# Patient Record
Sex: FEMALE | Race: WHITE | NOT HISPANIC OR LATINO | URBAN - METROPOLITAN AREA
[De-identification: names, ages, dates, MRNs, and addresses within clinical notes are randomized per-mention and may not be internally consistent; named-entity substitution may affect disease eponyms.]

---

## 2017-06-13 ENCOUNTER — OUTPATIENT (OUTPATIENT)
Dept: OUTPATIENT SERVICES | Facility: HOSPITAL | Age: 67
LOS: 1 days | Discharge: HOME | End: 2017-06-13

## 2017-06-28 DIAGNOSIS — Z12.31 ENCOUNTER FOR SCREENING MAMMOGRAM FOR MALIGNANT NEOPLASM OF BREAST: ICD-10-CM

## 2018-06-20 ENCOUNTER — OUTPATIENT (OUTPATIENT)
Dept: OUTPATIENT SERVICES | Facility: HOSPITAL | Age: 68
LOS: 1 days | Discharge: HOME | End: 2018-06-20

## 2018-06-20 DIAGNOSIS — Z12.31 ENCOUNTER FOR SCREENING MAMMOGRAM FOR MALIGNANT NEOPLASM OF BREAST: ICD-10-CM

## 2019-06-26 ENCOUNTER — OUTPATIENT (OUTPATIENT)
Dept: OUTPATIENT SERVICES | Facility: HOSPITAL | Age: 69
LOS: 1 days | Discharge: HOME | End: 2019-06-26
Payer: MEDICARE

## 2019-06-26 DIAGNOSIS — Z12.31 ENCOUNTER FOR SCREENING MAMMOGRAM FOR MALIGNANT NEOPLASM OF BREAST: ICD-10-CM

## 2019-06-26 PROCEDURE — 77063 BREAST TOMOSYNTHESIS BI: CPT | Mod: 26

## 2019-06-26 PROCEDURE — 77067 SCR MAMMO BI INCL CAD: CPT | Mod: 26

## 2019-07-28 ENCOUNTER — FORM ENCOUNTER (OUTPATIENT)
Age: 69
End: 2019-07-28

## 2019-09-02 ENCOUNTER — FORM ENCOUNTER (OUTPATIENT)
Age: 69
End: 2019-09-02

## 2019-09-22 ENCOUNTER — FORM ENCOUNTER (OUTPATIENT)
Age: 69
End: 2019-09-22

## 2020-03-01 ENCOUNTER — FORM ENCOUNTER (OUTPATIENT)
Age: 70
End: 2020-03-01

## 2020-06-29 ENCOUNTER — FORM ENCOUNTER (OUTPATIENT)
Age: 70
End: 2020-06-29

## 2020-06-30 ENCOUNTER — OUTPATIENT (OUTPATIENT)
Dept: OUTPATIENT SERVICES | Facility: HOSPITAL | Age: 70
LOS: 1 days | Discharge: HOME | End: 2020-06-30
Payer: MEDICARE

## 2020-06-30 DIAGNOSIS — Z12.31 ENCOUNTER FOR SCREENING MAMMOGRAM FOR MALIGNANT NEOPLASM OF BREAST: ICD-10-CM

## 2020-06-30 PROCEDURE — 77063 BREAST TOMOSYNTHESIS BI: CPT | Mod: 26

## 2020-06-30 PROCEDURE — 77067 SCR MAMMO BI INCL CAD: CPT | Mod: 26

## 2020-08-25 PROBLEM — Z00.00 ENCOUNTER FOR PREVENTIVE HEALTH EXAMINATION: Status: ACTIVE | Noted: 2020-08-25

## 2020-09-23 ENCOUNTER — APPOINTMENT (OUTPATIENT)
Dept: ORTHOPEDIC SURGERY | Facility: CLINIC | Age: 70
End: 2020-09-23
Payer: MEDICARE

## 2020-09-23 DIAGNOSIS — Z86.79 PERSONAL HISTORY OF OTHER DISEASES OF THE CIRCULATORY SYSTEM: ICD-10-CM

## 2020-09-23 DIAGNOSIS — M25.562 PAIN IN LEFT KNEE: ICD-10-CM

## 2020-09-23 DIAGNOSIS — M17.12 UNILATERAL PRIMARY OSTEOARTHRITIS, LEFT KNEE: ICD-10-CM

## 2020-09-23 DIAGNOSIS — Z86.39 PERSONAL HISTORY OF OTHER ENDOCRINE, NUTRITIONAL AND METABOLIC DISEASE: ICD-10-CM

## 2020-09-23 DIAGNOSIS — Z80.3 FAMILY HISTORY OF MALIGNANT NEOPLASM OF BREAST: ICD-10-CM

## 2020-09-23 DIAGNOSIS — Z80.0 FAMILY HISTORY OF MALIGNANT NEOPLASM OF DIGESTIVE ORGANS: ICD-10-CM

## 2020-09-23 DIAGNOSIS — Z87.39 PERSONAL HISTORY OF OTHER DISEASES OF THE MUSCULOSKELETAL SYSTEM AND CONNECTIVE TISSUE: ICD-10-CM

## 2020-09-23 PROCEDURE — 99203 OFFICE O/P NEW LOW 30 MIN: CPT

## 2020-09-23 PROCEDURE — 73562 X-RAY EXAM OF KNEE 3: CPT | Mod: LT

## 2020-09-23 RX ORDER — ATORVASTATIN CALCIUM 20 MG/1
20 TABLET, FILM COATED ORAL
Refills: 0 | Status: ACTIVE | COMMUNITY

## 2020-09-23 RX ORDER — ALPRAZOLAM 0.5 MG/1
0.5 TABLET ORAL
Refills: 0 | Status: ACTIVE | COMMUNITY

## 2020-09-23 RX ORDER — IRBESARTAN 300 MG/1
300 TABLET ORAL
Refills: 0 | Status: ACTIVE | COMMUNITY

## 2020-09-23 NOTE — PHYSICAL EXAM
[2+] : left 2+ [de-identified] : General appearance: well nourished and hydrated, pleasant, alert and oriented x 3, cooperative.\par Cardiovascular: no apparent abnormalities, no lower leg edema, no varicosities, pedal pulses are palpable.\par Neurologic: sensation is normal, no muscle weakness in upper or lower extremities\par Dermatologic no apparent skin lesions, moist, warm, no rash.\par Gait: nonantalgic.\par \par Left knee\par Inspection: no effusion or erythema.\par Wounds: none.\par Alignment: 15 degree valgus deformity \par Palpation: lateral joint line tenderness \par ROM: 0-115 degrees \par Ligamentous laxity: all ligaments appear stable\par Muscle Test: good quad strength.\par \par Right knee\par Inspection: no effusion or erythema.\par Wounds: well healed incision\par Alignment:normal\par Palpation: none\par ROM: 0-100 degrees \par Ligamentous laxity: all ligaments appear stable\par Muscle Test: good quad strength.\par \par Right and Left hip-\par FROM [de-identified] : Radiographs done today AP lateral and skyline of the left knee shows bone on bone in the lateral compartment with valgus deformity

## 2020-09-23 NOTE — HISTORY OF PRESENT ILLNESS
[Pain Location] : pain [] : left knee [Worsening] : worsening [10] : a maximum pain level of 10/10 [Standing] : standing [Daily] : ~He/She~ states the symptoms seem to be occuring daily [de-identified] : 69 year old female presents to the office today complaining of left knee pain x2 months. Patient reports having a right total knee replacement done by Dr. Scott in the past, but did not want to have that examined today. Patient reports pain that is sharp in nature. He reports swelling, buckling, locking, or clicking. Patient reports feeling as thought the pain can make her limp when it is at its worst. Patient reports being able to ambulate unlimited distances. She reports a pain and difficulty with negotiating stairs, she goes one at a time, and states descending is worse stating the knee will lock causing her to have to slide down the stairs. Patient reports taking Kaitlin Aspirin back and body for pain with some relief. Patient is here today to discuss her next options for pain relief in her left knee.

## 2020-09-23 NOTE — ASSESSMENT
[FreeTextEntry1] : 70 year old female who is s/p RTK with another surgeon, presents today for left knee pain. Pain level is 10/10. She is having difficulty negotiating stairs, and standing from a seated position. We discussed treatment option, surgical and non surgical. She will give these consideration and get back to us.

## 2020-11-17 ENCOUNTER — FORM ENCOUNTER (OUTPATIENT)
Age: 70
End: 2020-11-17

## 2021-05-10 ENCOUNTER — OUTPATIENT (OUTPATIENT)
Dept: OUTPATIENT SERVICES | Facility: HOSPITAL | Age: 71
LOS: 1 days | Discharge: HOME | End: 2021-05-10
Payer: MEDICARE

## 2021-05-10 ENCOUNTER — RESULT REVIEW (OUTPATIENT)
Age: 71
End: 2021-05-10

## 2021-05-10 DIAGNOSIS — M71.22 SYNOVIAL CYST OF POPLITEAL SPACE [BAKER], LEFT KNEE: ICD-10-CM

## 2021-05-10 PROCEDURE — 73723 MRI JOINT LWR EXTR W/O&W/DYE: CPT | Mod: 26,LT,MH

## 2021-07-13 ENCOUNTER — OUTPATIENT (OUTPATIENT)
Dept: OUTPATIENT SERVICES | Facility: HOSPITAL | Age: 71
LOS: 1 days | Discharge: HOME | End: 2021-07-13
Payer: MEDICARE

## 2021-07-13 ENCOUNTER — RESULT REVIEW (OUTPATIENT)
Age: 71
End: 2021-07-13

## 2021-07-13 DIAGNOSIS — Z12.31 ENCOUNTER FOR SCREENING MAMMOGRAM FOR MALIGNANT NEOPLASM OF BREAST: ICD-10-CM

## 2021-07-13 PROCEDURE — 77063 BREAST TOMOSYNTHESIS BI: CPT | Mod: 26

## 2021-07-13 PROCEDURE — 77067 SCR MAMMO BI INCL CAD: CPT | Mod: 26

## 2021-12-02 ENCOUNTER — APPOINTMENT (OUTPATIENT)
Dept: OBGYN | Facility: CLINIC | Age: 71
End: 2021-12-02
Payer: MEDICARE

## 2021-12-02 VITALS
TEMPERATURE: 97.9 F | WEIGHT: 185 LBS | DIASTOLIC BLOOD PRESSURE: 68 MMHG | HEIGHT: 66 IN | HEART RATE: 82 BPM | SYSTOLIC BLOOD PRESSURE: 106 MMHG | BODY MASS INDEX: 29.73 KG/M2

## 2021-12-02 DIAGNOSIS — N39.0 URINARY TRACT INFECTION, SITE NOT SPECIFIED: ICD-10-CM

## 2021-12-02 DIAGNOSIS — R10.84 GENERALIZED ABDOMINAL PAIN: ICD-10-CM

## 2021-12-02 DIAGNOSIS — N93.9 ABNORMAL UTERINE AND VAGINAL BLEEDING, UNSPECIFIED: ICD-10-CM

## 2021-12-02 LAB
BILIRUB UR QL STRIP: NEGATIVE
CLARITY UR: CLEAR
COLLECTION METHOD: NORMAL
GLUCOSE UR-MCNC: NEGATIVE
HCG UR QL: 0.2 EU/DL
HGB UR QL STRIP.AUTO: NEGATIVE
KETONES UR-MCNC: NEGATIVE
LEUKOCYTE ESTERASE UR QL STRIP: NEGATIVE
NITRITE UR QL STRIP: NEGATIVE
PH UR STRIP: 6
PROT UR STRIP-MCNC: NEGATIVE
SP GR UR STRIP: 1.02

## 2021-12-02 PROCEDURE — 81003 URINALYSIS AUTO W/O SCOPE: CPT | Mod: QW

## 2021-12-02 PROCEDURE — 99213 OFFICE O/P EST LOW 20 MIN: CPT | Mod: 25

## 2021-12-02 PROCEDURE — 76830 TRANSVAGINAL US NON-OB: CPT

## 2021-12-02 NOTE — REVIEW OF SYSTEMS
[Urgency] : no urgency [Frequency] : frequency [Urethral Discharge] : no urethral discharge [CVA Pain] : no CVA pain [Genital Rash/Irritation] : no genital rash/irritation [Back Pain] : back pain [Negative] : Heme/Lymph [FreeTextEntry8] : hematuria [FreeTextEntry9] : lower bilateral

## 2021-12-02 NOTE — DISCUSSION/SUMMARY
[FreeTextEntry1] : pt calling urgent care in [place] to get urine culture results\par need to r/o AUB in this patient\par if urine culture is positive I can assume her bleeding was caused by UTI, if culture negative I recommended a hysteroscopy with D&C\par I discussed this possibility with the patient today\par POCT urine dipstick negative today\par pt stated bleeding subsided after she started taking ABS

## 2021-12-02 NOTE — PHYSICAL EXAM
[Chaperone Present] : A chaperone was present in the examining room during all aspects of the physical examination [Soft] : soft [Non-distended] : non-distended [No Mass] : no mass [FreeTextEntry7] : tender [Labia Majora] : normal [Labia Minora] : normal [No Bleeding] : There was no active vaginal bleeding [Normal] : normal [Uterine Adnexae] : normal [FreeTextEntry4] : used estrogen cream last night

## 2021-12-02 NOTE — HISTORY OF PRESENT ILLNESS
[FreeTextEntry1] : Pt started bleeding with urination on Friday 11/26, only noticed bleeding with urination\par had frequency with gross hematuria and passage of dime size clots\par was in [place] at the time and went to urgent care, prescribed Bactrim, still taking them\par also c/o back pain and abdominal pain, cramping\par using estrogen cream [postmenopausal] : postmenopausal [Y] : Positive pregnancy history [[facility]] : 2 [facility] 2 [Currently In Menopause] : currently in menopause [Menopause Age: ____] : age at menopause was [unfilled]

## 2021-12-02 NOTE — PROCEDURE
[Abnormal Uterine Bleeding] : abnormal uterine bleeding [Anteverted] : anteverted [L: ___ cm] : L: [unfilled] cm [W: ___cm] : W: [unfilled] cm [Not Visualized] : not visualized [FreeTextEntry5] : lining thin

## 2021-12-06 ENCOUNTER — OUTPATIENT (OUTPATIENT)
Dept: OUTPATIENT SERVICES | Facility: HOSPITAL | Age: 71
LOS: 1 days | Discharge: HOME | End: 2021-12-06
Payer: MEDICARE

## 2021-12-06 DIAGNOSIS — N93.9 ABNORMAL UTERINE AND VAGINAL BLEEDING, UNSPECIFIED: ICD-10-CM

## 2021-12-06 PROCEDURE — 76700 US EXAM ABDOM COMPLETE: CPT | Mod: 26

## 2021-12-06 PROCEDURE — 76856 US EXAM PELVIC COMPLETE: CPT | Mod: 26

## 2021-12-16 ENCOUNTER — APPOINTMENT (OUTPATIENT)
Dept: OBGYN | Facility: CLINIC | Age: 71
End: 2021-12-16

## 2021-12-22 ENCOUNTER — NON-APPOINTMENT (OUTPATIENT)
Age: 71
End: 2021-12-22

## 2022-01-19 ENCOUNTER — NON-APPOINTMENT (OUTPATIENT)
Age: 72
End: 2022-01-19

## 2022-01-20 ENCOUNTER — NON-APPOINTMENT (OUTPATIENT)
Age: 72
End: 2022-01-20

## 2022-01-21 ENCOUNTER — APPOINTMENT (OUTPATIENT)
Dept: OBGYN | Facility: CLINIC | Age: 72
End: 2022-01-21

## 2022-02-07 DIAGNOSIS — R92.2 INCONCLUSIVE MAMMOGRAM: ICD-10-CM

## 2022-03-01 ENCOUNTER — RESULT REVIEW (OUTPATIENT)
Age: 72
End: 2022-03-01

## 2022-03-01 ENCOUNTER — OUTPATIENT (OUTPATIENT)
Dept: OUTPATIENT SERVICES | Facility: HOSPITAL | Age: 72
LOS: 1 days | Discharge: HOME | End: 2022-03-01
Payer: SELF-PAY

## 2022-03-01 DIAGNOSIS — R92.2 INCONCLUSIVE MAMMOGRAM: ICD-10-CM

## 2022-03-01 PROCEDURE — 76641 ULTRASOUND BREAST COMPLETE: CPT | Mod: 26,50

## 2022-04-10 DIAGNOSIS — C67.9 MALIGNANT NEOPLASM OF BLADDER, UNSPECIFIED: ICD-10-CM

## 2022-04-18 PROBLEM — C67.9 BLADDER CANCER: Status: ACTIVE | Noted: 2022-04-18

## 2022-07-23 ENCOUNTER — RESULT REVIEW (OUTPATIENT)
Age: 72
End: 2022-07-23

## 2022-07-23 ENCOUNTER — OUTPATIENT (OUTPATIENT)
Dept: OUTPATIENT SERVICES | Facility: HOSPITAL | Age: 72
LOS: 1 days | Discharge: HOME | End: 2022-07-23

## 2022-07-23 DIAGNOSIS — Z12.31 ENCOUNTER FOR SCREENING MAMMOGRAM FOR MALIGNANT NEOPLASM OF BREAST: ICD-10-CM

## 2022-07-23 PROCEDURE — 77067 SCR MAMMO BI INCL CAD: CPT | Mod: 26

## 2022-07-23 PROCEDURE — 77063 BREAST TOMOSYNTHESIS BI: CPT | Mod: 26

## 2022-08-02 ENCOUNTER — NON-APPOINTMENT (OUTPATIENT)
Age: 72
End: 2022-08-02

## 2022-11-29 ENCOUNTER — APPOINTMENT (OUTPATIENT)
Dept: OBGYN | Facility: CLINIC | Age: 72
End: 2022-11-29

## 2022-11-29 VITALS
WEIGHT: 180 LBS | HEART RATE: 86 BPM | SYSTOLIC BLOOD PRESSURE: 120 MMHG | HEIGHT: 66 IN | DIASTOLIC BLOOD PRESSURE: 78 MMHG | BODY MASS INDEX: 28.93 KG/M2

## 2022-11-29 DIAGNOSIS — N95.2 POSTMENOPAUSAL ATROPHIC VAGINITIS: ICD-10-CM

## 2022-11-29 DIAGNOSIS — Z85.51 PERSONAL HISTORY OF MALIGNANT NEOPLASM OF BLADDER: ICD-10-CM

## 2022-11-29 DIAGNOSIS — Z01.419 ENCOUNTER FOR GYNECOLOGICAL EXAMINATION (GENERAL) (ROUTINE) W/OUT ABNORMAL FINDINGS: ICD-10-CM

## 2022-11-29 PROCEDURE — G0101: CPT

## 2022-11-29 NOTE — HISTORY OF PRESENT ILLNESS
[FreeTextEntry1] : Pt is here for her annual\par Recently diagnosed with Bladder Cancer\par \par last gyn visit with me in [place]\par \par \par    	Print\par Last amended by Ollie Manjarrez MD on 2020 at 12:49pm	View Changes: \par [email or web address] 2020 12:49pm\par Patient\par Name	BRIANA DENSON (69yo, F) ID# [id number]	Appt. Date/Time	2020 12:20PM\par 	1950	Service Dept.	[facility] OFF[facility]\par Provider	OLLIE MANJARREZ MD\par Insurance	\par Med Primary[place]par Insurance # : [id number]\par Med Secondary: GHI (HMO)\par Insurance # : [id number]\par Employer Name : RETIRED\par Prescription: [employer] - This member could not be found in the payer's files. Please verify coverage and all member demographic information. details\par Chief Complaint\par Well Woman Visit\par Patient's Care Team\par Primary Care Provider: JERSON MARTINO MD: [address] [place] [address], Ph [phone], Fax [phone] NPI: [id number]\par Patient's Pharmacies\par [id number] ([place]): [address] [place] [address], Ph [phone], Fax [phone]\par [facility] ([place]): [address] [place] [address], Ph [phone], Fax [phone]\par Vitals\par 2020 12:32 pm\par Ht:	5 ft 6 in\par Wt:	185 lbs\par BMI:	29.9\par BP:	122/78 sitting\par Allergies\par Reviewed Allergies\par LEVAQUIN	\par NKDA\par Medications\par Reviewed Medications\par Bactrim  mg-160 mg tablet\par Take 1 tablet(s) every 12 hours by oral route for 3 days.\par 19   prescribed	Hortencia Jeff MD\par clotrimazole-betamethasone 1 %-0.05 % topical cream\par Apply 2 g twice a day by topical route as directed for 7 days.\par 19   prescribed	Hortencia Jeff MD\par Diflucan 150 mg tablet\par Take 1 tablet(s) by oral route for 1 day.\par 19   prescribed	Hortencia Jeff MD\par Estrace 0.01% (0.1 mg/gram) vaginal cream\par Insert 0.5 g twice a week by vaginal route at bedtime for 90 days.\par 20   prescribed	Ollie Manjarrez MD\par irbesartan\par 08/15/18   entered	William Christensen\par Macrobid 100 mg capsule\par Take 1 capsule(s) every 12 hours by oral route for 7 days.\par 19   prescribed	Debbie Cruz MD\par metroNIDAZOLE 500 mg tablet\par Take 1 tablet(s) every 12 hours by oral route for 7 days.\par Note: DO NOT CONSUME ALCOHOL WHILE TAKING THIS MEDICATION\par 19   prescribed	Hortencia Jeff MD\par pravastatin\par 13   entered	William Christensen\par Problems\par Reviewed Problems\par Urinary tract infectious disease\par Menopausal symptom\par Ultrasound scan abnormal\par Gynecologic examination\par Family History\par Reviewed Family History\par Mother	- Malignant neoplastic disease\par - PANCREATIC (previously recorded as Cancer)\par Father	- Malignant tumor of colon\par - previously recorded as Colon Cancer\par Maternal Aunt	- Malignant tumor of breast\par - previously recorded as Breast Cancer\par Social History\par Reviewed Social History\par Tobacco Smoking Status: Never smoker\par Non-smoker\par Smokeless Tobacco Status: Never used smokeless tobacco\par Tobacco-years of use: 0\par E-cigarette/Vape Status: Never used electronic cigarettes\par Most Recent Tobacco Use Screenin2020\par Surgical History\par Surgical History not reviewed (last reviewed 2019)\par Caesarean Section - X2\par Other - ARTHROSCOPY X 2\par GYN History\par Reviewed GYN History\par LMP: Definite.\par Date of LMP: (Notes: 50 y/old).\par Obstetric History\par Reviewed Obstetric History\par TOTAL	FULL	PRE	AB. I	AB. S	ECTOPICS	MULTIPLE	LIVING\par 2							2\par Past Medical History\par Past Medical History not reviewed (last reviewed 2019)\par Screening\par None recorded.\par HPI\par routine gyn exam\par ROS\par Patient reports no fatigue, no fever, no significant weight gain, and no significant weight loss. She reports no abnormal moles and no rashes. She reports no irritation and no vision changes. She reports no hearing loss, no ear pain, no nose/sinus problems, no sore throat, no snoring, no dry mouth, and no mouth ulcers. She reports no dyspnea / shortness of breath, no cough, no sputum production, no hemoptysis, and no wheezing. She reports no chest pain, no palpitations, and no orthopnea. She reports no heartburn, no dysphagia, no nausea, no vomiting, no abdominal pain, no bowel movement changes, no diarrhea, no constipation, and no rectal bleeding. She reports no hematuria, no abnormal bleeding, no flank pain, no trouble urinating, no incontinence, no rash, no lesion, no discharge, no vaginal odor, and no vaginal itching. She reports no menstrual problems and no PMDD symptoms. She reports no menopausal symptoms. She reports no sexual problems. She reports no muscle aches, no muscle weakness, no arthralgias/joint pain, and no back pain. She reports no headaches, no dizziness, no LOC, no weakness, no numbness, and no seizures. She reports no depression, no alcoholism, and no sleep disturbances.\par Physical Exam\par Patient is a 70-year-old female.\par \par Chaperone: Chaperone: present.\par \par Female Genitalia: Vulva: no masses, atrophy, or lesions. Bladder/Urethra: no urethral discharge or mass and normal meatus and bladder non distended. Vagina no tenderness, erythema, cystocele, rectocele, abnormal vaginal discharge, or vesicle(s) or ulcers. Cervix: no discharge or cervical motion tenderness and grossly normal. Uterus: normal size and shape and midline, mobile, non-tender, and no uterine prolapse. Adnexa/Parametria: no parametrial tenderness or mass and no adnexal tenderness or ovarian mass.\par \par Breast: Inspection/Palpation: no erythema, induration, tenderness, skin changes, abnormal secretions, or distinct masses and normal nipple appearance and non tender axillary lymph nodes.\par \par Abdomen: Auscultation/Inspection/Palpation: no tenderness, hepatomegaly, splenomegaly, masses, or CVA tenderness and soft, non-distended, and normal bowel sounds. Hernia: none palpated.\par Assessment / Plan\par 1. Atrophic vaginitis -\par improving\par \par if symptoms worsening , increase to 1g 2x/week\par \par N95.2: Postmenopausal atrophic vaginitis\par Estrace 0.01% (0.1 mg/gram) vaginal cream - Insert 0.5 g twice a week by vaginal route at bedtime for 90 days.     Qty: 1 42.5 gm tube(s)     Refills: 3     Pharmacy: [id number]\par \par 2. Gynecologic examination -\par mammo\par \par Z01.411: Encounter for gynecological examination (general) (routine) with abnormal findings\par PAP, LB\par \par \par Return to Office\par None recorded.\par Amendment Sign-Off\par Encounter signed-off by Ollie Manjarrez MD, 2020.\par Encounter performed and documented by Ollie Manjarrez MD\par Encounter reviewed & signed by Ollie Manjarrez MD on 2020 at 12:47pm\par Amendment closed by Ollie Manjarrez MD on 2020 at 12:49pm

## 2022-12-02 LAB — CYTOLOGY CVX/VAG DOC THIN PREP: ABNORMAL

## 2022-12-04 LAB — HPV HIGH+LOW RISK DNA PNL CVX: NOT DETECTED

## 2024-12-02 ENCOUNTER — APPOINTMENT (OUTPATIENT)
Dept: OBGYN | Facility: CLINIC | Age: 74
End: 2024-12-02